# Patient Record
Sex: FEMALE | Race: WHITE
[De-identification: names, ages, dates, MRNs, and addresses within clinical notes are randomized per-mention and may not be internally consistent; named-entity substitution may affect disease eponyms.]

---

## 2017-10-19 PROBLEM — Z00.00 ENCOUNTER FOR PREVENTIVE HEALTH EXAMINATION: Status: ACTIVE | Noted: 2017-10-19

## 2017-10-31 ENCOUNTER — APPOINTMENT (OUTPATIENT)
Dept: RHEUMATOLOGY | Facility: CLINIC | Age: 62
End: 2017-10-31
Payer: COMMERCIAL

## 2017-10-31 VITALS
OXYGEN SATURATION: 99 % | SYSTOLIC BLOOD PRESSURE: 122 MMHG | BODY MASS INDEX: 22.98 KG/M2 | HEIGHT: 59 IN | TEMPERATURE: 98 F | RESPIRATION RATE: 20 BRPM | DIASTOLIC BLOOD PRESSURE: 82 MMHG | HEART RATE: 82 BPM | WEIGHT: 114 LBS

## 2017-10-31 DIAGNOSIS — S63.241A: ICD-10-CM

## 2017-10-31 DIAGNOSIS — Z78.9 OTHER SPECIFIED HEALTH STATUS: ICD-10-CM

## 2017-10-31 DIAGNOSIS — M21.619 HALLUX VALGUS (ACQUIRED), UNSPECIFIED FOOT: ICD-10-CM

## 2017-10-31 DIAGNOSIS — M20.10 HALLUX VALGUS (ACQUIRED), UNSPECIFIED FOOT: ICD-10-CM

## 2017-10-31 DIAGNOSIS — Z87.891 PERSONAL HISTORY OF NICOTINE DEPENDENCE: ICD-10-CM

## 2017-10-31 DIAGNOSIS — Z82.49 FAMILY HISTORY OF ISCHEMIC HEART DISEASE AND OTHER DISEASES OF THE CIRCULATORY SYSTEM: ICD-10-CM

## 2017-10-31 DIAGNOSIS — Z86.79 PERSONAL HISTORY OF OTHER DISEASES OF THE CIRCULATORY SYSTEM: ICD-10-CM

## 2017-10-31 DIAGNOSIS — Z85.3 PERSONAL HISTORY OF MALIGNANT NEOPLASM OF BREAST: ICD-10-CM

## 2017-10-31 DIAGNOSIS — Z83.3 FAMILY HISTORY OF DIABETES MELLITUS: ICD-10-CM

## 2017-10-31 DIAGNOSIS — M17.0 BILATERAL PRIMARY OSTEOARTHRITIS OF KNEE: ICD-10-CM

## 2017-10-31 PROCEDURE — 99204 OFFICE O/P NEW MOD 45 MIN: CPT

## 2017-10-31 RX ORDER — POTASSIUM CHLORIDE 750 MG/1
10 TABLET, FILM COATED, EXTENDED RELEASE ORAL
Qty: 90 | Refills: 0 | Status: ACTIVE | COMMUNITY
Start: 2017-08-21

## 2017-10-31 RX ORDER — ENALAPRIL MALEATE 5 MG/1
5 TABLET ORAL
Qty: 90 | Refills: 0 | Status: ACTIVE | COMMUNITY
Start: 2017-08-21

## 2017-10-31 RX ORDER — METHYLPREDNISOLONE 4 MG/1
4 TABLET ORAL
Qty: 21 | Refills: 0 | Status: COMPLETED | COMMUNITY
Start: 2017-08-07

## 2017-10-31 RX ORDER — AMOXICILLIN AND CLAVULANATE POTASSIUM 500; 125 MG/1; MG/1
500-125 TABLET, FILM COATED ORAL
Qty: 20 | Refills: 0 | Status: COMPLETED | COMMUNITY
Start: 2017-08-07

## 2017-10-31 RX ORDER — AMLODIPINE BESYLATE 5 MG/1
5 TABLET ORAL
Qty: 90 | Refills: 0 | Status: ACTIVE | COMMUNITY
Start: 2017-08-21

## 2017-10-31 RX ORDER — METOPROLOL SUCCINATE 50 MG/1
50 TABLET, EXTENDED RELEASE ORAL
Qty: 90 | Refills: 0 | Status: ACTIVE | COMMUNITY
Start: 2017-08-01

## 2022-11-10 ENCOUNTER — OFFICE (OUTPATIENT)
Dept: URBAN - METROPOLITAN AREA CLINIC 113 | Facility: CLINIC | Age: 67
Setting detail: OPHTHALMOLOGY
End: 2022-11-10
Payer: COMMERCIAL

## 2022-11-10 DIAGNOSIS — S09.90XA: ICD-10-CM

## 2022-11-10 PROCEDURE — 99213 OFFICE O/P EST LOW 20 MIN: CPT | Performed by: OPHTHALMOLOGY

## 2022-11-10 ASSESSMENT — VISUAL ACUITY
OD_BCVA: 20/60-1
OS_BCVA: 20/30-1

## 2022-11-10 ASSESSMENT — REFRACTION_CURRENTRX
OS_AXIS: 098
OD_ADD: +2.50
OS_CYLINDER: -1.25
OD_CYLINDER: -0.75
OD_AXIS: 086
OS_VPRISM_DIRECTION: PROGS
OD_OVR_VA: 20/
OD_VPRISM_DIRECTION: PROGS
OD_SPHERE: -1.75
OS_OVR_VA: 20/
OS_ADD: +2.50
OS_SPHERE: +0.50

## 2022-11-10 ASSESSMENT — KERATOMETRY
OS_AXISANGLE_DEGREES: 113
OS_K1POWER_DIOPTERS: 46.50
OS_K2POWER_DIOPTERS: 47.00
OD_AXISANGLE_DEGREES: 107
OD_K1POWER_DIOPTERS: 45.75
OD_K2POWER_DIOPTERS: 47.50

## 2022-11-10 ASSESSMENT — REFRACTION_AUTOREFRACTION
OS_AXIS: 088
OD_AXIS: 077
OS_SPHERE: +0.25
OD_CYLINDER: -0.75
OS_CYLINDER: -0.75
OD_SPHERE: -1.50

## 2022-11-10 ASSESSMENT — LID EXAM ASSESSMENTS
OD_BLEPHARITIS: RLL RUL 2+
OS_BLEPHARITIS: LLL LUL 2+
OD_ECCHYMOSIS: RLL RUL 2+

## 2022-11-10 ASSESSMENT — TONOMETRY
OS_IOP_MMHG: 13
OD_IOP_MMHG: 17

## 2022-11-10 ASSESSMENT — AXIALLENGTH_DERIVED
OD_AL: 23.1818
OS_AL: 22.4999

## 2022-11-10 ASSESSMENT — CONFRONTATIONAL VISUAL FIELD TEST (CVF)
OS_FINDINGS: FULL
OD_FINDINGS: FULL

## 2022-11-10 ASSESSMENT — SPHEQUIV_DERIVED
OS_SPHEQUIV: -0.125
OD_SPHEQUIV: -1.875

## 2022-11-21 ENCOUNTER — RX ONLY (RX ONLY)
Age: 67
End: 2022-11-21

## 2022-11-21 ENCOUNTER — OFFICE (OUTPATIENT)
Dept: URBAN - METROPOLITAN AREA CLINIC 112 | Facility: CLINIC | Age: 67
Setting detail: OPHTHALMOLOGY
End: 2022-11-21
Payer: COMMERCIAL

## 2022-11-21 DIAGNOSIS — S09.90XA: ICD-10-CM

## 2022-11-21 PROBLEM — H01.002 BLEPHARITIS; LEFT UPPER LID, LEFT LOWER LID , RIGHT LOWER LID, RIGHT UPPER LID: Status: ACTIVE | Noted: 2022-11-21

## 2022-11-21 PROBLEM — H01.005 BLEPHARITIS; LEFT UPPER LID, LEFT LOWER LID , RIGHT LOWER LID, RIGHT UPPER LID: Status: ACTIVE | Noted: 2022-11-21

## 2022-11-21 PROBLEM — H00.1 CHALAZION; RIGHT LOWER LID: Status: ACTIVE | Noted: 2022-11-10

## 2022-11-21 PROBLEM — H01.004 BLEPHARITIS; LEFT UPPER LID, LEFT LOWER LID , RIGHT LOWER LID, RIGHT UPPER LID: Status: ACTIVE | Noted: 2022-11-21

## 2022-11-21 PROBLEM — H01.001 BLEPHARITIS; LEFT UPPER LID, LEFT LOWER LID , RIGHT LOWER LID, RIGHT UPPER LID: Status: ACTIVE | Noted: 2022-11-21

## 2022-11-21 PROCEDURE — 99213 OFFICE O/P EST LOW 20 MIN: CPT | Performed by: REGISTERED NURSE

## 2022-11-21 ASSESSMENT — LID EXAM ASSESSMENTS
OD_ECCHYMOSIS: RLL RUL T
OD_BLEPHARITIS: RLL RUL 2+
OS_BLEPHARITIS: LLL LUL 2+

## 2022-11-21 ASSESSMENT — REFRACTION_CURRENTRX
OS_SPHERE: +0.50
OD_OVR_VA: 20/
OS_VPRISM_DIRECTION: PROGS
OS_ADD: +2.50
OS_AXIS: 098
OD_ADD: +2.50
OD_AXIS: 086
OS_OVR_VA: 20/
OS_CYLINDER: -1.25
OD_VPRISM_DIRECTION: PROGS
OD_CYLINDER: -0.75
OD_SPHERE: -1.75

## 2022-11-21 ASSESSMENT — AXIALLENGTH_DERIVED
OS_AL: 22.4999
OD_AL: 23.2262

## 2022-11-21 ASSESSMENT — VISUAL ACUITY
OS_BCVA: 20/25-1
OD_BCVA: 20/50

## 2022-11-21 ASSESSMENT — KERATOMETRY
OS_K2POWER_DIOPTERS: 47.00
OD_K1POWER_DIOPTERS: 45.75
OS_AXISANGLE_DEGREES: 131
OS_K1POWER_DIOPTERS: 46.50
OD_AXISANGLE_DEGREES: 095
OD_K2POWER_DIOPTERS: 47.25

## 2022-11-21 ASSESSMENT — TONOMETRY
OD_IOP_MMHG: 19
OS_IOP_MMHG: 15

## 2022-11-21 ASSESSMENT — CONFRONTATIONAL VISUAL FIELD TEST (CVF)
OD_FINDINGS: FULL
OS_FINDINGS: FULL

## 2022-11-21 ASSESSMENT — REFRACTION_AUTOREFRACTION
OS_AXIS: 089
OD_CYLINDER: -0.75
OD_SPHERE: -1.50
OD_AXIS: 086
OS_CYLINDER: -0.75
OS_SPHERE: +0.25

## 2022-11-21 ASSESSMENT — SPHEQUIV_DERIVED
OD_SPHEQUIV: -1.875
OS_SPHEQUIV: -0.125

## 2022-12-22 ENCOUNTER — OFFICE (OUTPATIENT)
Dept: URBAN - METROPOLITAN AREA CLINIC 113 | Facility: CLINIC | Age: 67
Setting detail: OPHTHALMOLOGY
End: 2022-12-22
Payer: COMMERCIAL

## 2022-12-22 DIAGNOSIS — H02.422: ICD-10-CM

## 2022-12-22 DIAGNOSIS — S09.90XA: ICD-10-CM

## 2022-12-22 DIAGNOSIS — H02.835: ICD-10-CM

## 2022-12-22 DIAGNOSIS — D48.5: ICD-10-CM

## 2022-12-22 PROBLEM — H01.001 BLEPHARITIS; LEFT UPPER LID, LEFT LOWER LID , RIGHT LOWER LID, RIGHT UPPER LID: Status: ACTIVE | Noted: 2022-12-22

## 2022-12-22 PROBLEM — H01.005 BLEPHARITIS; LEFT UPPER LID, LEFT LOWER LID , RIGHT LOWER LID, RIGHT UPPER LID: Status: ACTIVE | Noted: 2022-12-22

## 2022-12-22 PROBLEM — H01.004 BLEPHARITIS; LEFT UPPER LID, LEFT LOWER LID , RIGHT LOWER LID, RIGHT UPPER LID: Status: ACTIVE | Noted: 2022-12-22

## 2022-12-22 PROBLEM — H01.002 BLEPHARITIS; LEFT UPPER LID, LEFT LOWER LID , RIGHT LOWER LID, RIGHT UPPER LID: Status: ACTIVE | Noted: 2022-12-22

## 2022-12-22 PROCEDURE — 99213 OFFICE O/P EST LOW 20 MIN: CPT | Performed by: OPHTHALMOLOGY

## 2022-12-22 ASSESSMENT — LID POSITION - PTOSIS: OS_PTOSIS: 1+

## 2022-12-22 ASSESSMENT — TONOMETRY
OD_IOP_MMHG: 18
OS_IOP_MMHG: 17

## 2022-12-22 ASSESSMENT — CONFRONTATIONAL VISUAL FIELD TEST (CVF)
OS_FINDINGS: FULL
OD_FINDINGS: FULL

## 2022-12-22 ASSESSMENT — LID EXAM ASSESSMENTS
OD_ECCHYMOSIS: RLL RUL T
OS_BLEPHARITIS: LLL LUL 2+
OD_BLEPHARITIS: RLL RUL 2+

## 2022-12-22 ASSESSMENT — LID POSITION - DERMATOCHALASIS: OS_DERMATOCHALASIS: LLL 2+

## 2022-12-23 PROBLEM — D48.5 LESION ; BOTH EYES: Status: ACTIVE | Noted: 2022-12-22

## 2022-12-23 ASSESSMENT — REFRACTION_AUTOREFRACTION
OD_CYLINDER: -0.75
OS_AXIS: 089
OD_SPHERE: -1.50
OS_CYLINDER: -0.75
OD_AXIS: 086
OS_SPHERE: +0.25

## 2022-12-23 ASSESSMENT — REFRACTION_CURRENTRX
OS_ADD: +2.50
OD_OVR_VA: 20/
OS_OVR_VA: 20/
OS_SPHERE: +0.50
OD_VPRISM_DIRECTION: PROGS
OD_CYLINDER: -0.75
OD_AXIS: 086
OS_AXIS: 098
OD_SPHERE: -1.75
OD_ADD: +2.50
OS_CYLINDER: -1.25
OS_VPRISM_DIRECTION: PROGS

## 2022-12-23 ASSESSMENT — KERATOMETRY
OD_K1POWER_DIOPTERS: 45.75
OS_AXISANGLE_DEGREES: 131
OD_AXISANGLE_DEGREES: 095
OD_K2POWER_DIOPTERS: 47.25
OS_K2POWER_DIOPTERS: 47.00
OS_K1POWER_DIOPTERS: 46.50

## 2022-12-23 ASSESSMENT — SPHEQUIV_DERIVED
OS_SPHEQUIV: -0.125
OD_SPHEQUIV: -1.875

## 2022-12-23 ASSESSMENT — VISUAL ACUITY
OS_BCVA: 20/25+1
OD_BCVA: 20/60

## 2022-12-23 ASSESSMENT — AXIALLENGTH_DERIVED
OS_AL: 22.4999
OD_AL: 23.2262

## 2023-01-20 ENCOUNTER — APPOINTMENT (OUTPATIENT)
Dept: OPHTHALMOLOGY | Facility: CLINIC | Age: 68
End: 2023-01-20
Payer: MEDICARE

## 2023-01-20 ENCOUNTER — NON-APPOINTMENT (OUTPATIENT)
Age: 68
End: 2023-01-20

## 2023-01-20 PROCEDURE — 92004 COMPRE OPH EXAM NEW PT 1/>: CPT

## 2023-01-20 PROCEDURE — 92202 OPSCPY EXTND ON/MAC DRAW: CPT

## 2023-08-04 ENCOUNTER — APPOINTMENT (OUTPATIENT)
Dept: OPHTHALMOLOGY | Facility: CLINIC | Age: 68
End: 2023-08-04
Payer: MEDICARE

## 2023-08-04 ENCOUNTER — NON-APPOINTMENT (OUTPATIENT)
Age: 68
End: 2023-08-04

## 2023-08-04 PROCEDURE — 92134 CPTRZ OPH DX IMG PST SGM RTA: CPT

## 2023-08-04 PROCEDURE — 92202 OPSCPY EXTND ON/MAC DRAW: CPT

## 2023-08-04 PROCEDURE — 92014 COMPRE OPH EXAM EST PT 1/>: CPT

## 2024-02-02 ENCOUNTER — APPOINTMENT (OUTPATIENT)
Dept: OPHTHALMOLOGY | Facility: CLINIC | Age: 69
End: 2024-02-02

## 2024-03-04 ENCOUNTER — APPOINTMENT (OUTPATIENT)
Dept: RHEUMATOLOGY | Facility: CLINIC | Age: 69
End: 2024-03-04
Payer: MEDICARE

## 2024-03-04 VITALS
HEART RATE: 66 BPM | WEIGHT: 116 LBS | HEIGHT: 59 IN | BODY MASS INDEX: 23.39 KG/M2 | SYSTOLIC BLOOD PRESSURE: 177 MMHG | DIASTOLIC BLOOD PRESSURE: 90 MMHG | OXYGEN SATURATION: 99 % | TEMPERATURE: 97.3 F

## 2024-03-04 DIAGNOSIS — S63.240A: ICD-10-CM

## 2024-03-04 DIAGNOSIS — M15.4 EROSIVE (OSTEO)ARTHRITIS: ICD-10-CM

## 2024-03-04 PROCEDURE — 99204 OFFICE O/P NEW MOD 45 MIN: CPT

## 2024-03-04 PROCEDURE — 36415 COLL VENOUS BLD VENIPUNCTURE: CPT

## 2024-03-04 NOTE — PHYSICAL EXAM
[TextEntry] : GENERAL: Appears in no acute distress  HEENT: EOMI, PERRLA. No conjunctival erythema. Moist mucous membranes. No nasopharyngeal ulcers  NECK: Supple, no cervical lymphadenopathy, no thyromegaly  CARDIOVASCULAR: RRR. S1, S2 auscultated. No murmurs or rubs.  PULMONARY: Clear to auscultation b/l, no wheezes, rales, or crackles  ABDOMINAL: Soft, nontender, nondistended. Bowel sounds present. No organomegaly.  MSK:  No active synovitis, swelling, erythema, or warmth.  Bilateral significant diffuse Heberden's nodes of DIPs with subluxations. Hammertoes of b/l toes and bunions of b/l 1st MTPs. SKIN: No lesions or rashes  NEURO: No focal deficits.  PSYCH: AAOx3. Normal affect and thought process.

## 2024-03-04 NOTE — ASSESSMENT
[FreeTextEntry1] : 67 y/o female w/ HX of breast CA s/p tamoxifen referred to rheumatology for pains in b/l hands and feet.  Pt was advised by podiatry to make sure she does not have RA prior to any surgery. Pt reports pain in toes and bottom of feet with prolonged standing. Pt has had hand deformities for many years without changes. Denies recurrent joint swelling, redness, warmth. Denies any other concerning symptoms. Patient takes care of her special needs son.  Patient was previously seen by Dr. Stahl in 2017 for hand shape changes. 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 Minimal elevated RF deemed to be clinically insignificant especially in setting of Hx of breast CA.  Patient likely has severe/erosive OA. Pt's joint symptoms are not inflammatory and has DIP involvement without psoriasis, IBD. Pt reportedly had low positive RF in past but I would agree with Dr. Stahl previously that it is likely clinically irrelevant and may be related to pt's Hx of cancer. Pt has osteopenia taking Ca and Vit D supplementation  - Obtain labwork to evaluate for signs of RA - Discussed with patient at length about treatment of OA and soft tissue injuries including oral/topical analgesics, pain therapies (yoga, maria dolores chi, acupuncture, chiropractor, massage therapy, wax therapy, etc.), PT/OT, steroid injections, surgeries. Advised on healthy diet, exercise, smoking avoidance, weight loss, stress management, sleep hygiene, control of comorbidities to help with management of pain and improve daily function. - Advised on Ca, Vit D, and weight bearing exercises for osteopenia. - Will contact pt with results of above workup. If unremarkable, pt does not need to follow up regularly with rheumatology. RTC PRN.

## 2024-03-04 NOTE — HISTORY OF PRESENT ILLNESS
[FreeTextEntry1] : 69 y/o female w/ HX of breast CA s/p tamoxifen referred to rheumatology for pains in b/l hands and feet.  Pt was advised by podiatry to make sure she does not have RA prior to any surgery. Pt reports pain in toes and bottom of feet with prolonged standing. Pt has had hand deformities for many years without changes. Denies recurrent joint swelling, redness, warmth. Denies any other concerning symptoms. Patient takes care of her special needs son.  Patient was previously seen by Dr. Stahl in 2017 for hand shape changes. 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 Minimal elevated RF deemed to be clinically insignificant especially in setting of Hx of breast CA.

## 2024-05-13 ENCOUNTER — APPOINTMENT (OUTPATIENT)
Dept: PODIATRY | Facility: CLINIC | Age: 69
End: 2024-05-13
Payer: MEDICARE

## 2024-05-13 DIAGNOSIS — Q66.6 OTHER CONGENITAL VALGUS DEFORMITIES OF FEET: ICD-10-CM

## 2024-05-13 DIAGNOSIS — B35.1 TINEA UNGUIUM: ICD-10-CM

## 2024-05-13 DIAGNOSIS — M79.674 PAIN IN RIGHT TOE(S): ICD-10-CM

## 2024-05-13 DIAGNOSIS — M77.52 OTHER ENTHESOPATHY OF LT FOOT AND ANKLE: ICD-10-CM

## 2024-05-13 DIAGNOSIS — M77.51 OTHER ENTHESOPATHY OF RT FOOT AND ANKLE: ICD-10-CM

## 2024-05-13 DIAGNOSIS — M79.675 PAIN IN LEFT TOE(S): ICD-10-CM

## 2024-05-13 PROCEDURE — 11721 DEBRIDE NAIL 6 OR MORE: CPT

## 2024-05-13 PROCEDURE — 99213 OFFICE O/P EST LOW 20 MIN: CPT | Mod: 25

## 2024-05-15 PROBLEM — M79.674 PAIN OF TOE OF RIGHT FOOT: Status: ACTIVE | Noted: 2024-05-15

## 2024-05-15 PROBLEM — Q66.6 VALGUS DEFORMITIES OF FEET, CONGENITAL: Status: ACTIVE | Noted: 2024-05-15

## 2024-05-15 PROBLEM — M77.51 BURSITIS OF RIGHT FOOT: Status: ACTIVE | Noted: 2024-05-15

## 2024-05-15 PROBLEM — M77.52 BURSITIS OF LEFT FOOT: Status: ACTIVE | Noted: 2024-05-15

## 2024-05-15 PROBLEM — M79.675 PAIN OF TOE OF LEFT FOOT: Status: ACTIVE | Noted: 2024-05-15

## 2024-05-15 PROBLEM — B35.1 ONYCHOMYCOSIS OF TOENAIL: Status: ACTIVE | Noted: 2024-05-15

## 2024-05-15 NOTE — ASSESSMENT
[FreeTextEntry1] : Assessment: Onychomycosis caused by T. Rubrum. Calcaneal valgus deformities, bilaterally with submetatarsal bursitis.  Plan: I debrided each toenail via mechanical trimming and electrical grinding.  All toenails were trimmed with a 14 cm sterile stainless steel box lock double spring nail splitter.  Then utilizing a sterile pear shaped nikki rosaura (this device falls under bur, surgical, general & plastic surgery.  The FDA deems this item a Class-1 device) via a 35,000 RPM electric drill and vacuum and dust extractor system all toenails were aseptically debrided removing fungal layers.  This is done to diminish the fungal load of the toenails and enhance the effects of the antifungal medication, allowing overall improvement in the degree of fungal infection as well as improve appearance and reduce discomfort and help diminish chances of secondary bacterial infection, also lessening the chance of ingrown nails, especially when performed on a regular basis.  The patient was encouraged to continue with the topical antifungal medication everyday and use the Clean Sweep antifungal spray to disinfect their shoes. She was encouraged to call the office with any questions or problems as they may arise.  Utilizing a sterile #15 blade, I sharply debrided the painful hyperkeratotic lesions on both feet.  I applied dispensed dispersion padding.  I encouraged her to keep regular appointments and return in two months.  PTR: 2 months.  Attending Attestation (For Attendings USE Only)...

## 2024-05-15 NOTE — PHYSICAL EXAM
[TextEntry] : The toenails were thick and discolored on the toes of both feet 1 - 5.  Mild subungual debris was noted on multiple toes as well as incurvation of the toenails.  Upon palpation pain was elicited.  There did not appear to be bacterial infection noted.  The pedal pulses and neurological parameters were unchanged and no other dermatological changes were noted on either foot.  The patient exhibited a flatfooted appearance upon weight bearing with collapsed medial arches and lateral bowing of the Achilles tendons. A biomechanical exam was performed on the patient.  The patient was seated in the chair.  Evaluation of the joints of the foot in passive range of motion was performed.  The sub talar joint was evaluated in both neutral and relaxed position.  The sub talar joint is noted to be in a pronated position.  There is also a mild equinus deformity noted.  Upon examination of the forefoot there is a slight forefoot varus present in compensation for the valgus position of the heel and a pronated sub talar joint.  Decreased ankle dorsiflexion was noted bilateral, the affected foot had less dorsiflexion. First metatarsal phalangeal joint dorsiflexion was limited. Resting calcaneal stance position was 5 degrees everted bilateral.  	Gait exam was also performed, which reveals an eversion of the heel bone and mid foot collapse with excessive pronation.  The forefoot is in a compensated varus position during gait as well.  Evaluation of shoe gear reveals excessive lateral heel wear bilateral with considerable breakdown of the heel counter and excessive flexibility of the soles.  There are painful hyperkeratotic lesions plantar to the fourth metatarsal heads on both feet.  Both areas are painful.

## 2024-05-15 NOTE — HISTORY OF PRESENT ILLNESS
[FreeTextEntry1] : The patient returned to the office stating that her toenails were hurting.  She stated that they feel better after being treated here.  The patient said it is sore on toes 1, 2, 3, 4 and 5 both feet.  The application of the medication was being done.  She returns after a long absence complaining of pain in both feet.  She states that it hurts to walk on both feet.

## 2024-07-22 ENCOUNTER — APPOINTMENT (OUTPATIENT)
Dept: PODIATRY | Facility: CLINIC | Age: 69
End: 2024-07-22

## 2025-05-20 ENCOUNTER — APPOINTMENT (OUTPATIENT)
Dept: RHEUMATOLOGY | Facility: CLINIC | Age: 70
End: 2025-05-20
Payer: MEDICARE

## 2025-05-20 DIAGNOSIS — M15.4 EROSIVE (OSTEO)ARTHRITIS: ICD-10-CM

## 2025-05-20 DIAGNOSIS — R89.9 UNSPECIFIED ABNORMAL FINDING IN SPECIMENS FROM OTHER ORGANS, SYSTEMS AND TISSUES: ICD-10-CM

## 2025-05-20 DIAGNOSIS — S63.240A: ICD-10-CM

## 2025-05-20 PROCEDURE — 99214 OFFICE O/P EST MOD 30 MIN: CPT

## 2025-05-30 ENCOUNTER — APPOINTMENT (OUTPATIENT)
Dept: ULTRASOUND IMAGING | Facility: CLINIC | Age: 70
End: 2025-05-30

## 2025-06-10 ENCOUNTER — RESULT REVIEW (OUTPATIENT)
Age: 70
End: 2025-06-10

## 2025-06-10 ENCOUNTER — OUTPATIENT (OUTPATIENT)
Dept: OUTPATIENT SERVICES | Facility: HOSPITAL | Age: 70
LOS: 1 days | End: 2025-06-10
Payer: MEDICARE

## 2025-06-10 ENCOUNTER — APPOINTMENT (OUTPATIENT)
Dept: ULTRASOUND IMAGING | Facility: CLINIC | Age: 70
End: 2025-06-10

## 2025-06-10 DIAGNOSIS — M15.4 EROSIVE (OSTEO)ARTHRITIS: ICD-10-CM

## 2025-06-10 PROCEDURE — 76881 US COMPL JOINT R-T W/IMG: CPT | Mod: 26,76,RT

## 2025-06-10 PROCEDURE — 76881 US COMPL JOINT R-T W/IMG: CPT

## 2025-06-13 ENCOUNTER — NON-APPOINTMENT (OUTPATIENT)
Age: 70
End: 2025-06-13

## 2025-06-16 ENCOUNTER — APPOINTMENT (OUTPATIENT)
Dept: RHEUMATOLOGY | Facility: CLINIC | Age: 70
End: 2025-06-16

## 2025-06-30 ENCOUNTER — APPOINTMENT (OUTPATIENT)
Dept: RHEUMATOLOGY | Facility: CLINIC | Age: 70
End: 2025-06-30